# Patient Record
Sex: FEMALE | Employment: FULL TIME | URBAN - METROPOLITAN AREA
[De-identification: names, ages, dates, MRNs, and addresses within clinical notes are randomized per-mention and may not be internally consistent; named-entity substitution may affect disease eponyms.]

---

## 2017-09-20 ENCOUNTER — OFFICE VISIT (OUTPATIENT)
Dept: OPHTHALMOLOGY | Facility: CLINIC | Age: 47
End: 2017-09-20
Payer: COMMERCIAL

## 2017-09-20 DIAGNOSIS — E11.9 TYPE 2 DIABETES MELLITUS WITHOUT RETINOPATHY: Primary | ICD-10-CM

## 2017-09-20 DIAGNOSIS — H52.13 MYOPIA WITH PRESBYOPIA, BILATERAL: ICD-10-CM

## 2017-09-20 DIAGNOSIS — H52.4 MYOPIA WITH PRESBYOPIA, BILATERAL: ICD-10-CM

## 2017-09-20 PROCEDURE — 99999 PR PBB SHADOW E&M-EST. PATIENT-LVL I: CPT | Mod: PBBFAC,,, | Performed by: OPTOMETRIST

## 2017-09-20 PROCEDURE — 92014 COMPRE OPH EXAM EST PT 1/>: CPT | Mod: S$GLB,,, | Performed by: OPTOMETRIST

## 2017-09-20 PROCEDURE — 92015 DETERMINE REFRACTIVE STATE: CPT | Mod: S$GLB,,, | Performed by: OPTOMETRIST

## 2017-09-20 RX ORDER — LISINOPRIL AND HYDROCHLOROTHIAZIDE 10; 12.5 MG/1; MG/1
1 TABLET ORAL
COMMUNITY
Start: 2017-07-07

## 2017-09-20 RX ORDER — METFORMIN HYDROCHLORIDE 500 MG/1
1000 TABLET, EXTENDED RELEASE ORAL
COMMUNITY
Start: 2017-07-07

## 2017-09-20 RX ORDER — LEVOTHYROXINE SODIUM 100 UG/1
100 TABLET ORAL
COMMUNITY
Start: 2017-07-21

## 2017-09-20 RX ORDER — DAPAGLIFLOZIN 5 MG/1
5 TABLET, FILM COATED ORAL
COMMUNITY
Start: 2017-07-13

## 2017-09-20 NOTE — PROGRESS NOTES
HPI     Pt's last eye exam with DNL was 8/13/15.  Diabetic eye exam  Diagnosed with diabetes in 2015  Recent vision fluctuations No  Blood sugar: 107 last night  Any vision changes since last exam: Pt states that when she removes her   glasses, her vision is a little more blurred.  Eye pain: No  Other ocular symptoms: None    Do you wear currently wear glasses or contacts? Rx glasses    Interested in contacts today? None    Do you plan on getting new glasses today? If needed.    Last edited by Wilfredo Campos, Patient Care Assistant on 9/20/2017  9:22   AM. (History)            Assessment /Plan     For exam results, see Encounter Report.    Type 2 diabetes mellitus without retinopathy  No diabetic retinopathy in either eye today. Reviewed importance of yearly dilated eye exams. Continue close care with PCP regarding diabetes.    Myopia with presbyopia, bilateral  Eyeglass Final Rx     Eyeglass Final Rx       Sphere Add    Right -1.00 +1.75    Left -1.00 +1.75    Type:  PAL    Expiration Date:  9/21/2018              RTC 1 yr for dilated eye exam or PRN if any problems.   Discussed above and answered questions.

## 2017-09-20 NOTE — LETTER
September 20, 2017        Alfreda Wong, XAVIER, FNP-C  [address] [facility] Medicine  [place] [address]             [facility]  [address] [facility] [place] [address]  Phone: [phone]  Fax: [phone]   Patient: Lizett Herzog   MR Number: [id number]   YOB: 1970   Date of Visit: 9/20/2017       Dear Dr. Wong:    I saw your patient, Lizett Herzog, today for evaluation. Attached you will find relevant portions of my assessment and plan of care.         For exam results, see Encounter Report.    Type 2 diabetes mellitus without retinopathy  No diabetic retinopathy in either eye today. Reviewed importance of yearly dilated eye exams. Continue close care with PCP regarding diabetes.    Myopia with presbyopia, bilateral  Eyeglass Final Rx     Eyeglass Final Rx       Sphere Add    Right -1.00 +1.75    Left -1.00 +1.75    Type:  PAL    Expiration Date:  9/21/2018              RTC 1 yr for dilated eye exam or PRN if any problems.   Discussed above and answered questions.              If you have questions, please do not hesitate to call me. I look forward to following Lizett Herzog along with you.    Sincerely,      Juan Manuel Bray, OD            CC  No Recipients    Enclosure

## 2019-08-24 ENCOUNTER — OFFICE VISIT (OUTPATIENT)
Dept: OPHTHALMOLOGY | Facility: CLINIC | Age: 49
End: 2019-08-24
Payer: COMMERCIAL

## 2019-08-24 DIAGNOSIS — H52.4 MYOPIA WITH PRESBYOPIA, BILATERAL: ICD-10-CM

## 2019-08-24 DIAGNOSIS — H40.013 OPEN ANGLE WITH BORDERLINE FINDINGS OF BOTH EYES: ICD-10-CM

## 2019-08-24 DIAGNOSIS — H52.13 MYOPIA WITH PRESBYOPIA, BILATERAL: ICD-10-CM

## 2019-08-24 DIAGNOSIS — E11.9 TYPE 2 DIABETES MELLITUS WITHOUT RETINOPATHY: Primary | ICD-10-CM

## 2019-08-24 PROCEDURE — 92014 PR EYE EXAM, EST PATIENT,COMPREHESV: ICD-10-PCS | Mod: S$GLB,,, | Performed by: OPTOMETRIST

## 2019-08-24 PROCEDURE — 92015 DETERMINE REFRACTIVE STATE: CPT | Mod: S$GLB,,, | Performed by: OPTOMETRIST

## 2019-08-24 PROCEDURE — 99999 PR PBB SHADOW E&M-EST. PATIENT-LVL I: ICD-10-PCS | Mod: PBBFAC,,, | Performed by: OPTOMETRIST

## 2019-08-24 PROCEDURE — 92133 CPTRZD OPH DX IMG PST SGM ON: CPT | Mod: S$GLB,,, | Performed by: OPTOMETRIST

## 2019-08-24 PROCEDURE — 76514 PR  US, EYE, FOR CORNEAL THICKNESS: ICD-10-PCS | Mod: S$GLB,,, | Performed by: OPTOMETRIST

## 2019-08-24 PROCEDURE — 99999 PR PBB SHADOW E&M-EST. PATIENT-LVL I: CPT | Mod: PBBFAC,,, | Performed by: OPTOMETRIST

## 2019-08-24 PROCEDURE — 92014 COMPRE OPH EXAM EST PT 1/>: CPT | Mod: S$GLB,,, | Performed by: OPTOMETRIST

## 2019-08-24 PROCEDURE — 92133 POSTERIOR SEGMENT OCT OPTIC NERVE(OCULAR COHERENCE TOMOGRAPHY) - OU - BOTH EYES: ICD-10-PCS | Mod: S$GLB,,, | Performed by: OPTOMETRIST

## 2019-08-24 PROCEDURE — 76514 ECHO EXAM OF EYE THICKNESS: CPT | Mod: S$GLB,,, | Performed by: OPTOMETRIST

## 2019-08-24 PROCEDURE — 92015 PR REFRACTION: ICD-10-PCS | Mod: S$GLB,,, | Performed by: OPTOMETRIST

## 2019-08-26 NOTE — PROGRESS NOTES
HPI     Pt's last eye exam with DNL was 9/20/17. Pt c/o blurred overall va and   wears gls full time.   Diabetic eye exam  Diagnosed with diabetes in 2015  Recent vision fluctuations No  Blood sugar: does not check  Any vision changes since last exam: decreased distance va  Eye pain: No  Other ocular symptoms: None     Do you wear currently wear glasses or contacts? gls     Interested in contacts today? None     Do you plan on getting new glasses today? If needed.     Last edited by Kitty Benavidez MA on 8/24/2019  9:58 AM. (History)            Assessment /Plan     For exam results, see Encounter Report.    Type 2 diabetes mellitus without retinopathy  No diabetic retinopathy OD, OS  Continue close care with PCP  Monitor 12 months    Open angle with borderline findings of both eyes  -     Posterior Segment OCT Optic Nerve- Both eyes  Borderline thinning temp OD  Normal, symmetric GCL OU  Thick pachs  Monitor for now    Myopia with presbyopia, bilateral  Eyeglass Final Rx     Eyeglass Final Rx       Sphere Cylinder Axis    Right -1.25 +0.75 155    Left -1.50 +0.75 015    Type:  SVL    Expiration Date:  8/24/2020              RTC 6 months for IOP check and 24-2VF or PRN  Discussed above and all questions were answered.

## 2020-10-20 DIAGNOSIS — E03.9 HYPOTHYROIDISM, UNSPECIFIED TYPE: Primary | ICD-10-CM

## 2020-10-23 ENCOUNTER — OFFICE VISIT (OUTPATIENT)
Dept: OPHTHALMOLOGY | Facility: CLINIC | Age: 50
End: 2020-10-23
Payer: COMMERCIAL

## 2020-10-23 DIAGNOSIS — H52.4 MYOPIA WITH ASTIGMATISM AND PRESBYOPIA, BILATERAL: ICD-10-CM

## 2020-10-23 DIAGNOSIS — H40.013 OPEN ANGLE WITH BORDERLINE FINDINGS OF BOTH EYES: ICD-10-CM

## 2020-10-23 DIAGNOSIS — H52.13 MYOPIA WITH ASTIGMATISM AND PRESBYOPIA, BILATERAL: ICD-10-CM

## 2020-10-23 DIAGNOSIS — H10.10 SEASONAL ALLERGIC CONJUNCTIVITIS: ICD-10-CM

## 2020-10-23 DIAGNOSIS — E11.9 TYPE 2 DIABETES MELLITUS WITHOUT RETINOPATHY: Primary | ICD-10-CM

## 2020-10-23 DIAGNOSIS — H52.203 MYOPIA WITH ASTIGMATISM AND PRESBYOPIA, BILATERAL: ICD-10-CM

## 2020-10-23 PROCEDURE — 92133 CPTRZD OPH DX IMG PST SGM ON: CPT | Mod: S$GLB,,, | Performed by: OPTOMETRIST

## 2020-10-23 PROCEDURE — 92133 POSTERIOR SEGMENT OCT OPTIC NERVE(OCULAR COHERENCE TOMOGRAPHY) - OU - BOTH EYES: ICD-10-PCS | Mod: S$GLB,,, | Performed by: OPTOMETRIST

## 2020-10-23 PROCEDURE — 92015 PR REFRACTION: ICD-10-PCS | Mod: S$GLB,,, | Performed by: OPTOMETRIST

## 2020-10-23 PROCEDURE — 99999 PR PBB SHADOW E&M-EST. PATIENT-LVL III: CPT | Mod: PBBFAC,,, | Performed by: OPTOMETRIST

## 2020-10-23 PROCEDURE — 92015 DETERMINE REFRACTIVE STATE: CPT | Mod: S$GLB,,, | Performed by: OPTOMETRIST

## 2020-10-23 PROCEDURE — 92014 PR EYE EXAM, EST PATIENT,COMPREHESV: ICD-10-PCS | Mod: S$GLB,,, | Performed by: OPTOMETRIST

## 2020-10-23 PROCEDURE — 99999 PR PBB SHADOW E&M-EST. PATIENT-LVL III: ICD-10-PCS | Mod: PBBFAC,,, | Performed by: OPTOMETRIST

## 2020-10-23 PROCEDURE — 92014 COMPRE OPH EXAM EST PT 1/>: CPT | Mod: S$GLB,,, | Performed by: OPTOMETRIST

## 2020-10-23 RX ORDER — OLOPATADINE HYDROCHLORIDE 1 MG/ML
1 SOLUTION/ DROPS OPHTHALMIC 2 TIMES DAILY
Qty: 5 ML | Refills: 4 | Status: SHIPPED | OUTPATIENT
Start: 2020-10-23

## 2020-10-23 NOTE — LETTER
October 23, 2020        AXVIER Reveles, FNP-C  [address] Internal Medicine  [place] [address]             [facility]hthalmology  [address] [facility] [address]  Phone: [phone]  Fax: [phone]   Patient: Lizett Herzog   MR Number: [id number]   YOB: 1970   Date of Visit: 10/23/2020       Dear Dr. Wong:    I saw your patient, Lizett Herzog, today for evaluation. Attached you will find relevant portions of my assessment and plan of care.       If you have questions, please do not hesitate to call me. I look forward to following Lizett Herzog along with you.    Sincerely,      Juan Manuel Bray, OD            CC  No Recipients    Enclosure

## 2020-10-23 NOTE — PROGRESS NOTES
HPI     Diabetic Eye Exam     Comments: Yearly              Comments     Last seen by DNL on 8/24/19 for yearly eye exam  Patien there today for yearly eye exam  Diabetic eye exam  Diagnosed with diabetes in 5+ years ago  Recent vision fluctuations No  Lab Results       Component                Value               Date                       HGBA1C                   8.1 (H)             09/25/2020            HPI    Any vision changes since last exam: No   Eye pain: No  Other ocular symptoms: Redness OD    Do you wear currently wear glasses or contacts? Glasses     Interested in contacts today? No    Do you plan on getting new glasses today? If needed                Last edited by Magi Rincon, PCT on 10/23/2020  2:13 PM.   (History)            Assessment /Plan     For exam results, see Encounter Report.    Type 2 diabetes mellitus without retinopathy  No diabetic retinopathy in either eye  Continue close care with PCP   Monitor 12 months    Open angle with borderline findings of both eyes  -     Posterior Segment OCT Optic Nerve- Both eyes  Borderline high IOPs today OU with thick pachs  Normal, symmetric GCL OU  Monitor 12 months    Seasonal allergic conjunctivitis  Other orders  -     olopatadine (PATANOL) 0.1 % ophthalmic solution; Place 1 drop into both eyes 2 (two) times daily.  Dispense: 5 mL; Refill: 4  Pt will let us know if symptoms do not improve after 7 days of using Patanol    Myopia with astigmatism and presbyopia, bilateral  Eyeglass Final Rx     Eyeglass Final Rx       Sphere Cylinder Axis Add    Right -1.25 +0.75 160 +2.00    Left -1.25 +0.25 020 +2.00    Expiration Date: 10/24/2021                  RTC 1 yr for dilated eye exam and gOCT or PRN if any problems.   Discussed above and answered questions.

## 2020-10-23 NOTE — LETTER
October 23, 2020        Alfreda Wong, XAVIER, FNP-C  [address] [facility] [address]  [place] [facility]  [place] [address]             [facility] - Ophthalmology  [address] [facility] [place] [address]  Phone: [phone]  Fax: [phone]   Patient: Lizett Herzog   MR Number: [id number]   YOB: 1970   Date of Visit: 10/23/2020       Dear Dr. Wong:    I saw your patient, Lizett Herzog, today for evaluation. Attached you will find relevant portions of my assessment and plan of care.    For exam results, see Encounter Report.    Type 2 diabetes mellitus without retinopathy  No diabetic retinopathy in either eye  Continue close care with PCP   Monitor 12 months    Open angle with borderline findings of both eyes  -     Posterior Segment OCT Optic Nerve- Both eyes  Borderline high IOPs today OU with thick pachs  Normal, symmetric GCL OU  Monitor 12 months    Seasonal allergic conjunctivitis  Other orders  -     olopatadine (PATANOL) 0.1 % ophthalmic solution; Place 1 drop into both eyes 2 (two) times daily.  Dispense: 5 mL; Refill: 4  Pt will let us know if symptoms do not improve after 7 days of using Patanol    Myopia with astigmatism and presbyopia, bilateral  Eyeglass Final Rx     Eyeglass Final Rx       Sphere Cylinder Axis Add    Right -1.25 +0.75 160 +2.00    Left -1.25 +0.25 020 +2.00    Expiration Date: 10/24/2021                  RTC 1 yr for dilated eye exam and gOCT or PRN if any problems.   Discussed above and answered questions.            If you have questions, please do not hesitate to call me. I look forward to following Lizett Herzog along with you.    Sincerely,      Juan Manuel Bray, OD            CC  No Recipients    Enclosure

## 2020-11-30 ENCOUNTER — LAB VISIT (OUTPATIENT)
Dept: LAB | Facility: HOSPITAL | Age: 50
End: 2020-11-30
Attending: INTERNAL MEDICINE
Payer: COMMERCIAL

## 2020-11-30 DIAGNOSIS — E03.9 HYPOTHYROIDISM, UNSPECIFIED TYPE: ICD-10-CM

## 2020-11-30 LAB
ALBUMIN SERPL BCP-MCNC: 4.1 G/DL (ref 3.5–5.2)
ANION GAP SERPL CALC-SCNC: 15 MMOL/L (ref 8–16)
BACTERIA #/AREA URNS HPF: NORMAL /HPF
BASOPHILS # BLD AUTO: 0.04 K/UL (ref 0–0.2)
BASOPHILS NFR BLD: 0.3 % (ref 0–1.9)
BILIRUB UR QL STRIP: NEGATIVE
BUN SERPL-MCNC: 20 MG/DL (ref 6–20)
CALCIUM SERPL-MCNC: 10.3 MG/DL (ref 8.7–10.5)
CHLORIDE SERPL-SCNC: 102 MMOL/L (ref 95–110)
CLARITY UR: CLEAR
CO2 SERPL-SCNC: 21 MMOL/L (ref 23–29)
COLOR UR: YELLOW
CREAT SERPL-MCNC: 1.2 MG/DL (ref 0.5–1.4)
DIFFERENTIAL METHOD: ABNORMAL
EOSINOPHIL # BLD AUTO: 0.1 K/UL (ref 0–0.5)
EOSINOPHIL NFR BLD: 1.2 % (ref 0–8)
ERYTHROCYTE [DISTWIDTH] IN BLOOD BY AUTOMATED COUNT: 13.7 % (ref 11.5–14.5)
EST. GFR  (AFRICAN AMERICAN): >60 ML/MIN/1.73 M^2
EST. GFR  (NON AFRICAN AMERICAN): 52 ML/MIN/1.73 M^2
GLUCOSE SERPL-MCNC: 149 MG/DL (ref 70–110)
GLUCOSE UR QL STRIP: ABNORMAL
HCT VFR BLD AUTO: 37.2 % (ref 37–48.5)
HGB BLD-MCNC: 12.4 G/DL (ref 12–16)
HGB UR QL STRIP: NEGATIVE
IMM GRANULOCYTES # BLD AUTO: 0.05 K/UL (ref 0–0.04)
IMM GRANULOCYTES NFR BLD AUTO: 0.4 % (ref 0–0.5)
IRON SERPL-MCNC: 43 UG/DL (ref 30–160)
KETONES UR QL STRIP: NEGATIVE
LEUKOCYTE ESTERASE UR QL STRIP: NEGATIVE
LYMPHOCYTES # BLD AUTO: 3.6 K/UL (ref 1–4.8)
LYMPHOCYTES NFR BLD: 29.9 % (ref 18–48)
MCH RBC QN AUTO: 28.2 PG (ref 27–31)
MCHC RBC AUTO-ENTMCNC: 33.3 G/DL (ref 32–36)
MCV RBC AUTO: 85 FL (ref 82–98)
MICROSCOPIC COMMENT: NORMAL
MONOCYTES # BLD AUTO: 0.9 K/UL (ref 0.3–1)
MONOCYTES NFR BLD: 7.1 % (ref 4–15)
NEUTROPHILS # BLD AUTO: 7.4 K/UL (ref 1.8–7.7)
NEUTROPHILS NFR BLD: 61.1 % (ref 38–73)
NITRITE UR QL STRIP: NEGATIVE
NRBC BLD-RTO: 0 /100 WBC
PH UR STRIP: 6 [PH] (ref 5–8)
PHOSPHATE SERPL-MCNC: 4.5 MG/DL (ref 2.7–4.5)
PLATELET # BLD AUTO: 298 K/UL (ref 150–350)
PMV BLD AUTO: 10.7 FL (ref 9.2–12.9)
POTASSIUM SERPL-SCNC: 3.8 MMOL/L (ref 3.5–5.1)
PROT UR QL STRIP: NEGATIVE
PTH-INTACT SERPL-MCNC: 53 PG/ML (ref 9–77)
RBC # BLD AUTO: 4.4 M/UL (ref 4–5.4)
SATURATED IRON: 11 % (ref 20–50)
SODIUM SERPL-SCNC: 138 MMOL/L (ref 136–145)
SP GR UR STRIP: 1.02 (ref 1–1.03)
SQUAMOUS #/AREA URNS HPF: 6 /HPF
TOTAL IRON BINDING CAPACITY: 403 UG/DL (ref 250–450)
TRANSFERRIN SERPL-MCNC: 272 MG/DL (ref 200–375)
URN SPEC COLLECT METH UR: ABNORMAL
WBC # BLD AUTO: 12.09 K/UL (ref 3.9–12.7)
WBC #/AREA URNS HPF: 2 /HPF (ref 0–5)
YEAST URNS QL MICRO: NORMAL

## 2020-11-30 PROCEDURE — 85025 COMPLETE CBC W/AUTO DIFF WBC: CPT

## 2020-11-30 PROCEDURE — 83970 ASSAY OF PARATHORMONE: CPT

## 2020-11-30 PROCEDURE — 36415 COLL VENOUS BLD VENIPUNCTURE: CPT

## 2020-11-30 PROCEDURE — 83540 ASSAY OF IRON: CPT

## 2020-11-30 PROCEDURE — 81000 URINALYSIS NONAUTO W/SCOPE: CPT

## 2020-11-30 PROCEDURE — 80069 RENAL FUNCTION PANEL: CPT

## 2020-12-02 ENCOUNTER — OFFICE VISIT (OUTPATIENT)
Dept: NEPHROLOGY | Facility: CLINIC | Age: 50
End: 2020-12-02
Payer: COMMERCIAL

## 2020-12-02 VITALS
BODY MASS INDEX: 34.44 KG/M2 | WEIGHT: 201.75 LBS | HEART RATE: 80 BPM | DIASTOLIC BLOOD PRESSURE: 80 MMHG | SYSTOLIC BLOOD PRESSURE: 122 MMHG | HEIGHT: 64 IN | RESPIRATION RATE: 20 BRPM

## 2020-12-02 DIAGNOSIS — E83.52 HYPERCALCEMIA: Primary | ICD-10-CM

## 2020-12-02 PROCEDURE — 99999 PR PBB SHADOW E&M-EST. PATIENT-LVL IV: CPT | Mod: PBBFAC,,, | Performed by: INTERNAL MEDICINE

## 2020-12-02 PROCEDURE — 99999 PR PBB SHADOW E&M-EST. PATIENT-LVL IV: ICD-10-PCS | Mod: PBBFAC,,, | Performed by: INTERNAL MEDICINE

## 2020-12-02 PROCEDURE — 99204 OFFICE O/P NEW MOD 45 MIN: CPT | Mod: S$GLB,,, | Performed by: INTERNAL MEDICINE

## 2020-12-02 PROCEDURE — 1126F PR PAIN SEVERITY QUANTIFIED, NO PAIN PRESENT: ICD-10-PCS | Mod: S$GLB,,, | Performed by: INTERNAL MEDICINE

## 2020-12-02 PROCEDURE — 1126F AMNT PAIN NOTED NONE PRSNT: CPT | Mod: S$GLB,,, | Performed by: INTERNAL MEDICINE

## 2020-12-02 PROCEDURE — 3072F LOW RISK FOR RETINOPATHY: CPT | Mod: S$GLB,,, | Performed by: INTERNAL MEDICINE

## 2020-12-02 PROCEDURE — 3008F BODY MASS INDEX DOCD: CPT | Mod: CPTII,S$GLB,, | Performed by: INTERNAL MEDICINE

## 2020-12-02 PROCEDURE — 3008F PR BODY MASS INDEX (BMI) DOCUMENTED: ICD-10-PCS | Mod: CPTII,S$GLB,, | Performed by: INTERNAL MEDICINE

## 2020-12-02 PROCEDURE — 3072F PR LOW RISK FOR RETINOPATHY: ICD-10-PCS | Mod: S$GLB,,, | Performed by: INTERNAL MEDICINE

## 2020-12-02 PROCEDURE — 99204 PR OFFICE/OUTPT VISIT, NEW, LEVL IV, 45-59 MIN: ICD-10-PCS | Mod: S$GLB,,, | Performed by: INTERNAL MEDICINE

## 2020-12-02 RX ORDER — GLIMEPIRIDE 2 MG/1
2 TABLET ORAL
COMMUNITY

## 2020-12-02 RX ORDER — DAPAGLIFLOZIN AND METFORMIN HYDROCHLORIDE 5; 1000 MG/1; MG/1
2 TABLET, FILM COATED, EXTENDED RELEASE ORAL DAILY
COMMUNITY

## 2020-12-02 RX ORDER — LEVOTHYROXINE SODIUM 100 UG/1
100 TABLET ORAL
COMMUNITY

## 2020-12-02 RX ORDER — ATORVASTATIN CALCIUM 40 MG/1
40 TABLET, FILM COATED ORAL DAILY
COMMUNITY

## 2020-12-02 RX ORDER — LINAGLIPTIN 5 MG/1
5 TABLET, FILM COATED ORAL DAILY
COMMUNITY

## 2020-12-02 NOTE — LETTER
December 2, 2020      XAVIER Reveles, FNP-C  [address] Internal Medicine  [facility] [address]           [facility] Nephrology  [address] [facility] [address]  Phone: [phone]  Fax: [phone]          Patient: Lizett Herzog   MR Number: [id number]   YOB: 1970   Date of Visit: 12/2/2020       Dear Alfreda Wong:    Thank you for referring Lizett Herzog to me for evaluation. Attached you will find relevant portions of my assessment and plan of care.    If you have questions, please do not hesitate to call me. I look forward to following Lizett Herzog along with you.    Sincerely,    Jt Vieyra MD    Enclosure  CC:  No Recipients    If you would like to receive this communication electronically, please contact [email or web address] or [phone] to request more information on [facility] access.    For providers and/or their staff who would like to refer a patient to [facility], please contact us through our one-stop-shop provider referral line, [facility], at [phone].    If you feel you have received this communication in error or would no longer like to receive these types of communications, please e-mail [email or web address]

## 2020-12-02 NOTE — PROGRESS NOTES
Lizett Herzog is a 50 y.o. female for whom nephrology consult has been requested to evaluate and give opinion.   Renal clinic consult note:  Date of consult: 12/2/20  Reason for consult: hypercalcemia  Referring provider: Alfreda Wong NP    HPI: Thank you for referring the pt to us. H/o and chart were reviewed. Pt was seen and examine. Pt is a 49 y/o female with h/o of HTN who was referred to us for mild hypercalcemia. Pt has no active or acute c/o's, no sx's of hypercalcemia reported. Other labs as part of the w/u reviewed. Pt has normal iPTH. A review of the meds show she takes HCTZ. Pt is not on Tums, Rolaids, or other hypercalcemic meds. There is cholecalciferol on the med list as well as both combination lisinopril/HCTZ and HCTZ separately. Pt says that she is no longer taking the vit D medication or HCTZ. She is still on lisinopril/HCTZ combination.    Incidentally noted recent +COVID-19 infection (2 weeks ago).      PAST MEDICAL HISTORY:  She  has a past medical history of Hyperlipidemia and Hypothyroidism.    PAST SURGICAL HISTORY:  She  has a past surgical history that includes Tubal ligation.    SOCIAL HISTORY:  She  reports that she has never smoked. She has never used smokeless tobacco. She reports current alcohol use. She reports that she does not use drugs.    FAMILY MEDICAL HISTORY:  Her family history includes Cataracts in her father and mother; Diabetes in her father.    Review of patient's allergies indicates:   Allergen Reactions    Rosuvastatin Itching           Prior to Admission medications    Medication Sig Start Date End Date Taking? Authorizing Provider   atorvastatin (LIPITOR) 40 MG tablet Take 40 mg by mouth once daily.   Yes Historical Provider   dapagliflozin-metformin (XIGDUO XR) 5-1,000 mg TBph Take 2 tablets by mouth once daily.   Yes Historical Provider   glimepiride (AMARYL) 2 MG tablet Take 2 mg by mouth daily with breakfast.   Yes Historical Provider  "  levothyroxine (SYNTHROID) 100 MCG tablet Take 100 mcg by mouth before breakfast. Brand name only   Yes Historical Provider   linaGLIPtin (TRADJENTA) 5 mg Tab tablet Take 5 mg by mouth once daily.   Yes Historical Provider   lisinopril-hydrochlorothiazide (PRINZIDE,ZESTORETIC) 10-12.5 mg per tablet Take 1 tablet by mouth. 7/7/17  Yes Historical Provider   olopatadine (PATANOL) 0.1 % ophthalmic solution Place 1 drop into both eyes 2 (two) times daily. 10/23/20  Yes Juan Manuel Bray, OD   atorvastatin (LIPITOR) 20 MG tablet 20 mg once daily.  10/19/14   Historical Provider   cholecalciferol, vitamin D3, 50,000 unit capsule 2 x a week x 3 months then once monthly 7/8/15   Historical Provider   dapagliflozin (FARXIGA) 5 mg Tab tablet Take 5 mg by mouth. 7/13/17   Historical Provider   FERROUS FUMARATE (IRON ORAL) Take by mouth once daily.    Historical Provider   hydrochlorothiazide (MICROZIDE) 12.5 mg capsule 12.5 mg once daily.  10/5/14   Historical Provider   levothyroxine (SYNTHROID) 100 MCG tablet Take 100 mcg by mouth. 7/21/17   Historical Provider   liraglutide 0.6 mg/0.1 mL, 18 mg/3 mL, subq PNIJ 0.6 mg/0.1 mL (18 mg/3 mL) PnIj Inject 1.2 mg into the skin. 7/7/17   Historical Provider   medroxyPROGESTERone (PROVERA) 10 MG tablet Take two tablets once a day for 10 days and repeat monthly with each cycle for 3 months 10/28/14   Homa Chiang, BAIRON   metformin (GLUCOPHAGE-XR) 500 MG 24 hr tablet Take 1,000 mg by mouth. 7/7/17   Historical Provider   metronidazole (FLAGYL) 500 MG tablet Take one pill by mouth twice a day, no alcohol with this medication 10/28/14   Homa Chiang NP   pen needle, diabetic, safety 29 gauge x 3/16" Ndle Give the smallest pen needle to use with victoza 12/2/16   Historical Provider   rosuvastatin (CRESTOR) 10 MG tablet Take 10 mg by mouth. 7/6/15 7/5/16  Historical Provider        REVIEW OF SYSTEMS:  Patient has no fever, fatigue, visual changes, chest pain, edema, cough, dyspnea, " "nausea, vomiting, constipation, diarrhea, arthralgias, pruritis, dizziness, weakness, depression, confusion.    [unfilled]    PHYSICAL EXAM:   height is 5' 4" (1.626 m) and weight is 91.5 kg (201 lb 11.5 oz). Her blood pressure is 122/80 and her pulse is 80. Her respiration is 20.   Gen: WDWN female in no apparent distress  Psych: Normal mood and affect  In NAD  Skin: No rashes or ulcers  Eyes: Normal   ENT: Normal hearing   Neck: No JVD  Ext: No edema    Labs reviewed  BMP  Lab Results   Component Value Date     11/30/2020    K 3.8 11/30/2020     11/30/2020    CO2 21 (L) 11/30/2020    BUN 20 11/30/2020    CREATININE 1.2 11/30/2020    CALCIUM 10.3 11/30/2020    ANIONGAP 15 11/30/2020    ESTGFRAFRICA >60 11/30/2020    EGFRNONAA 52 (A) 11/30/2020     Lab Results   Component Value Date    WBC 12.09 11/30/2020    HGB 12.4 11/30/2020    HCT 37.2 11/30/2020    MCV 85 11/30/2020     11/30/2020     s alb 4.1    Lab Results   Component Value Date    PTH 53.0 11/30/2020    CALCIUM 10.3 11/30/2020    PHOS 4.5 11/30/2020           IMPRESSION AND RECOMMENDATIONS: 49 y/o female with prior mild hypercalcemia:    1. Renal: repeat labs show normal calcium  Prior hypercalcemia was mild, and was likely related to HCTZ and vit D.  DDX: related to hypothyroidism (but recent TSH was normal)  PO4 is normal  iPTH is normal  s Cr is normal. Stable and unchanged renal function  No other causes suspected at present     2. H/o of HTN: BP controlled  Meds reviewed  May continue current dose of HCTZ 12.5 mg po qd    3. Very recent +COVID-19 infection was noted.  Contact with pt was kept at minimum      Plans and recommendations:  As discussed above  Opportunity for questions provided  Total time spent 45 minutes including time needed to review the records, the   patient evaluation, documentation, face-to-face discussion with the patient,   more than 50% of the time was spent on coordination of care and counseling.    Level IV " visit.  RTC prn    Jt Vieyra MD

## 2023-08-09 ENCOUNTER — OFFICE VISIT (OUTPATIENT)
Dept: OPHTHALMOLOGY | Facility: CLINIC | Age: 53
End: 2023-08-09
Payer: COMMERCIAL

## 2023-08-09 DIAGNOSIS — H52.13 MYOPIA WITH ASTIGMATISM AND PRESBYOPIA, BILATERAL: ICD-10-CM

## 2023-08-09 DIAGNOSIS — H52.203 MYOPIA WITH ASTIGMATISM AND PRESBYOPIA, BILATERAL: ICD-10-CM

## 2023-08-09 DIAGNOSIS — E11.9 TYPE 2 DIABETES MELLITUS WITHOUT RETINOPATHY: Primary | ICD-10-CM

## 2023-08-09 DIAGNOSIS — H52.4 MYOPIA WITH ASTIGMATISM AND PRESBYOPIA, BILATERAL: ICD-10-CM

## 2023-08-09 DIAGNOSIS — H40.013 OPEN ANGLE WITH BORDERLINE FINDINGS OF BOTH EYES: ICD-10-CM

## 2023-08-09 PROCEDURE — 1160F RVW MEDS BY RX/DR IN RCRD: CPT | Mod: CPTII,S$GLB,, | Performed by: OPTOMETRIST

## 2023-08-09 PROCEDURE — 4010F ACE/ARB THERAPY RXD/TAKEN: CPT | Mod: CPTII,S$GLB,, | Performed by: OPTOMETRIST

## 2023-08-09 PROCEDURE — 2023F PR DILATED RETINAL EXAM W/O EVID OF RETINOPATHY: ICD-10-PCS | Mod: CPTII,S$GLB,, | Performed by: OPTOMETRIST

## 2023-08-09 PROCEDURE — 1159F MED LIST DOCD IN RCRD: CPT | Mod: CPTII,S$GLB,, | Performed by: OPTOMETRIST

## 2023-08-09 PROCEDURE — 2023F DILAT RTA XM W/O RTNOPTHY: CPT | Mod: CPTII,S$GLB,, | Performed by: OPTOMETRIST

## 2023-08-09 PROCEDURE — 92014 PR EYE EXAM, EST PATIENT,COMPREHESV: ICD-10-PCS | Mod: S$GLB,,, | Performed by: OPTOMETRIST

## 2023-08-09 PROCEDURE — 92133 POSTERIOR SEGMENT OCT OPTIC NERVE(OCULAR COHERENCE TOMOGRAPHY) - OU - BOTH EYES: ICD-10-PCS | Mod: S$GLB,,, | Performed by: OPTOMETRIST

## 2023-08-09 PROCEDURE — 92015 DETERMINE REFRACTIVE STATE: CPT | Mod: S$GLB,,, | Performed by: OPTOMETRIST

## 2023-08-09 PROCEDURE — 99999 PR PBB SHADOW E&M-EST. PATIENT-LVL III: ICD-10-PCS | Mod: PBBFAC,,, | Performed by: OPTOMETRIST

## 2023-08-09 PROCEDURE — 4010F PR ACE/ARB THEARPY RXD/TAKEN: ICD-10-PCS | Mod: CPTII,S$GLB,, | Performed by: OPTOMETRIST

## 2023-08-09 PROCEDURE — 1159F PR MEDICATION LIST DOCUMENTED IN MEDICAL RECORD: ICD-10-PCS | Mod: CPTII,S$GLB,, | Performed by: OPTOMETRIST

## 2023-08-09 PROCEDURE — 92015 PR REFRACTION: ICD-10-PCS | Mod: S$GLB,,, | Performed by: OPTOMETRIST

## 2023-08-09 PROCEDURE — 99999 PR PBB SHADOW E&M-EST. PATIENT-LVL III: CPT | Mod: PBBFAC,,, | Performed by: OPTOMETRIST

## 2023-08-09 PROCEDURE — 1160F PR REVIEW ALL MEDS BY PRESCRIBER/CLIN PHARMACIST DOCUMENTED: ICD-10-PCS | Mod: CPTII,S$GLB,, | Performed by: OPTOMETRIST

## 2023-08-09 PROCEDURE — 92014 COMPRE OPH EXAM EST PT 1/>: CPT | Mod: S$GLB,,, | Performed by: OPTOMETRIST

## 2023-08-09 PROCEDURE — 92133 CPTRZD OPH DX IMG PST SGM ON: CPT | Mod: S$GLB,,, | Performed by: OPTOMETRIST

## 2023-08-09 NOTE — PROGRESS NOTES
HPI     Diabetic Eye Exam            Comments: Last eye exam 10/23/20 DNL  RTC 1 yr for dilated eye exam and gOCT  Diagnosed with diabetes: not sure  Lab Results       Component                Value               Date                       HGBA1C                   6.3 (H)             05/19/2023            Vision changes since last eye exam?: yes     Any eye pain today: no    Other ocular symptoms: no    Interested in contact lens fitting today? no                            Comments    Pt states near VA is more difficult          Last edited by Jessica Acevedo on 8/9/2023  9:31 AM.            Assessment /Plan     For exam results, see Encounter Report.    Type 2 diabetes mellitus without retinopathy  There was no diabetic retinopathy present in either eye today.   Recommended that pt continue care with PCP and/or specialists regarding diabetes.  Follow-up dilated eye exam recommended in 12 months, sooner with any vision changes or new concerns.    Open angle with borderline findings of both eyes  -     Posterior Segment OCT Optic Nerve- Both eyes  IOP stable today OU  gOCT stable   No evidence of the disease at this time   Monitor 12 months    Myopia with astigmatism and presbyopia, bilateral  Eyeglass Final Rx       Eyeglass Final Rx         Sphere Cylinder Axis Add    Right -1.00 +0.50 160 +2.25    Left -1.25 +0.25 030 +2.25      Expiration Date: 8/9/2024                  RTC 1 yr for dilated eye exam and gOCT or PRN if any problems.   Discussed above and answered questions.

## 2025-02-21 ENCOUNTER — OFFICE VISIT (OUTPATIENT)
Dept: OPHTHALMOLOGY | Facility: CLINIC | Age: 55
End: 2025-02-21
Payer: COMMERCIAL

## 2025-02-21 DIAGNOSIS — H40.013 OPEN ANGLE WITH BORDERLINE FINDINGS OF BOTH EYES: ICD-10-CM

## 2025-02-21 DIAGNOSIS — H52.13 MYOPIA WITH ASTIGMATISM AND PRESBYOPIA, BILATERAL: ICD-10-CM

## 2025-02-21 DIAGNOSIS — H52.203 MYOPIA WITH ASTIGMATISM AND PRESBYOPIA, BILATERAL: ICD-10-CM

## 2025-02-21 DIAGNOSIS — H52.4 MYOPIA WITH ASTIGMATISM AND PRESBYOPIA, BILATERAL: ICD-10-CM

## 2025-02-21 DIAGNOSIS — E11.9 TYPE 2 DIABETES MELLITUS WITHOUT RETINOPATHY: Primary | ICD-10-CM

## 2025-02-21 NOTE — PROGRESS NOTES
HPI     Diabetic Eye Exam            Comments: Pt presents for an annual diabetic eye exam.     Va stable.     Pt denies flashes/ floaters/ pains at this time.     Lab Results       Component                Value               Date                       HGBA1C                   6.6 (H)             02/03/2025                   Last edited by Beatrice Werner on 2/21/2025  2:35 PM.            Assessment /Plan     For exam results, see Encounter Report.    Type 2 diabetes mellitus without retinopathy  There was no diabetic retinopathy present in either eye today.   Recommended that pt continue care with PCP and/or specialists regarding diabetes.  Follow-up dilated eye exam recommended in 12 months, sooner with any vision changes or new concerns.    Open angle with borderline findings of both eyes  -     Posterior Segment OCT Optic Nerve- Both eyes  Stable IOP today OD, OS  Stable gOCT today OU compared to previous scans  No evidence of glaucoma at this time but based on risk factors recommend to continue monitoring.   Monitor 12 months    Myopia with astigmatism and presbyopia, bilateral  Eyeglass Final Rx       Eyeglass Final Rx         Sphere Cylinder Axis Add    Right -0.75 +0.50 173 +2.25    Left -1.00 +0.25 180 +2.25      Expiration Date: 2/21/2026                    RTC 1 yr for dilated eye exam and gOCT or PRN if any problems.   Discussed above and answered questions.